# Patient Record
Sex: FEMALE
[De-identification: names, ages, dates, MRNs, and addresses within clinical notes are randomized per-mention and may not be internally consistent; named-entity substitution may affect disease eponyms.]

---

## 2022-12-12 PROBLEM — Z00.00 ENCOUNTER FOR PREVENTIVE HEALTH EXAMINATION: Status: ACTIVE | Noted: 2022-12-12

## 2023-01-03 ENCOUNTER — APPOINTMENT (OUTPATIENT)
Dept: ENDOCRINOLOGY | Facility: CLINIC | Age: 68
End: 2023-01-03
Payer: MEDICARE

## 2023-01-03 VITALS
DIASTOLIC BLOOD PRESSURE: 75 MMHG | BODY MASS INDEX: 26.9 KG/M2 | WEIGHT: 137 LBS | HEART RATE: 103 BPM | SYSTOLIC BLOOD PRESSURE: 136 MMHG | HEIGHT: 60 IN

## 2023-01-03 VITALS — WEIGHT: 137 LBS | BODY MASS INDEX: 26.9 KG/M2 | HEIGHT: 60 IN

## 2023-01-03 DIAGNOSIS — Z83.3 FAMILY HISTORY OF DIABETES MELLITUS: ICD-10-CM

## 2023-01-03 DIAGNOSIS — E78.2 MIXED HYPERLIPIDEMIA: ICD-10-CM

## 2023-01-03 DIAGNOSIS — E11.9 TYPE 2 DIABETES MELLITUS W/OUT COMPLICATIONS: ICD-10-CM

## 2023-01-03 PROCEDURE — 99204 OFFICE O/P NEW MOD 45 MIN: CPT

## 2023-01-03 RX ORDER — OMEPRAZOLE 40 MG/1
40 CAPSULE, DELAYED RELEASE ORAL
Qty: 30 | Refills: 0 | Status: ACTIVE | COMMUNITY
Start: 2022-07-22

## 2023-01-03 RX ORDER — 70%ISOPROPYL ALCOHOL 0.7 ML/ML
70 SWAB TOPICAL
Qty: 100 | Refills: 0 | Status: ACTIVE | COMMUNITY
Start: 2022-12-03

## 2023-01-03 RX ORDER — OLOPATADINE HYDROCHLORIDE 2 MG/ML
0.2 SOLUTION OPHTHALMIC
Qty: 2 | Refills: 0 | Status: ACTIVE | COMMUNITY
Start: 2022-07-22

## 2023-01-03 RX ORDER — LANCETS 30 GAUGE
EACH MISCELLANEOUS
Qty: 100 | Refills: 0 | Status: ACTIVE | COMMUNITY
Start: 2022-12-03

## 2023-01-03 RX ORDER — AZELASTINE HYDROCHLORIDE 0.5 MG/ML
0.05 SOLUTION/ DROPS OPHTHALMIC
Qty: 6 | Refills: 0 | Status: ACTIVE | COMMUNITY
Start: 2022-12-31

## 2023-01-03 RX ORDER — ATORVASTATIN CALCIUM 80 MG/1
80 TABLET, FILM COATED ORAL
Qty: 30 | Refills: 0 | Status: ACTIVE | COMMUNITY
Start: 2022-12-31

## 2023-01-03 RX ORDER — ALBUTEROL SULFATE 90 UG/1
108 (90 BASE) INHALANT RESPIRATORY (INHALATION)
Qty: 18 | Refills: 0 | Status: ACTIVE | COMMUNITY
Start: 2022-08-19

## 2023-01-03 RX ORDER — DAPAGLIFLOZIN 10 MG/1
10 TABLET, FILM COATED ORAL
Qty: 30 | Refills: 0 | Status: ACTIVE | COMMUNITY
Start: 2022-07-22

## 2023-01-03 RX ORDER — CARVEDILOL 3.12 MG/1
3.12 TABLET, FILM COATED ORAL
Qty: 60 | Refills: 0 | Status: ACTIVE | COMMUNITY
Start: 2022-12-31

## 2023-01-03 RX ORDER — BLOOD SUGAR DIAGNOSTIC
STRIP MISCELLANEOUS
Qty: 100 | Refills: 0 | Status: ACTIVE | COMMUNITY
Start: 2022-12-03

## 2023-01-03 RX ORDER — ICOSAPENT ETHYL 1000 MG/1
1 CAPSULE ORAL
Qty: 120 | Refills: 0 | Status: ACTIVE | COMMUNITY
Start: 2022-07-22

## 2023-01-05 RX ORDER — FLASH GLUCOSE SCANNING READER
EACH MISCELLANEOUS
Qty: 1 | Refills: 0 | Status: ACTIVE | COMMUNITY
Start: 2023-01-05 | End: 1900-01-01

## 2023-01-12 NOTE — CONSULT LETTER
[Dear  ___] : Dear  [unfilled], [Consult Letter:] : I had the pleasure of evaluating your patient, [unfilled]. [Please see my note below.] : Please see my note below. [Consult Closing:] : Thank you very much for allowing me to participate in the care of this patient.  If you have any questions, please do not hesitate to contact me. [Sincerely,] : Sincerely, [FreeTextEntry1] : Ms. Arenas has uncontrolled diabetes with diet non compliance as a huge factor.  I added glimepiride and pioglitazone but she has to reduce her carb intake to improve her sugar control.\par \par  [FreeTextEntry3] : Radha Weston MD\par Division of Endocrinology\par [facility]

## 2023-01-12 NOTE — ASSESSMENT
[FreeTextEntry1] : Diabetes, uncontrolled due to diet non compliance.\par Advised pt to reduce carbs (rice) in diet.  it's not that the medications didn't work, but she is out-eating medication effect (no medication is strong enough to counter her diet).  Carb guide given, recommended pt limit carb portions to half cup per meal; eat more vegetables and protein.  Suggested cabbage, other greens, tofu.\par Exercising 10 min after meal will help blunt post prandial glucose spike.\par She doesn't want to take insulin but may be agreeable in the future.  Discussed declining insulin reserve with longer duration of diabetes such that insulin use is often needed in long standing diabetes.  She also likely burned through her insulin reserve quickly given that her diet was high in carbs and her glucose control was not good (hyperglycemia is toxic to beta cells). \par For now, increase metformin to BID, continue Farxiga and I am adding pioglitazone 30mg/day (this will also help reduce trigs and improve fatty liver) and glimepiride 2mg (take with first meal of the day).\par I recommended using Roger 2 system to monitor glucose.  We watched video on how to apply sensor and she downloaded the Roger 2 олег for her phone.   Advised pt to check/scan sensor before each meal and at bedtime to check sugars.\par will add GLP1 agonist and/or insulin next visit if sugars are still not controlled.\par ophtho recommended.  I explained that she will not feel any symptoms of retinopathy, but diabetic changes could be occurring and only the eye doctor will be able to tell.\par continue statin therapy\par RTO 2 months

## 2023-01-12 NOTE — HISTORY OF PRESENT ILLNESS
[FreeTextEntry1] : Diabetes diagnosed about 8 years ago.  Her parents and 2 brothers have diabetes.  Her brothers are taking insulin.\par She says at first, she didn't care, so she didn't follow the diabetic diet.\par Now she stopped putting sugar in her coffee and she recently changed her rice to half brown, half white.  But her rice portions are large and she eats rice with every meal. \par her PCP put a Vernon Pro on her and she has the report:  14d (from 9/20 to 10/4) average 360.  8% high and 92% very high (nothing in range!).   the daily graph shows the sugar is high all the time.\par She had blurry vision before, but now is ok.\par she has dry mouth and nocturia 2-3x/night\par some tingling in her arm\par no chest pain or SOB\par she has not seen ophtho, says she didn't want to go\par She ate ice cream last night, and today, she has eaten noodle soup and rice.\par When she tried taking metformin BID, she gets dizzy.  SHe has used injectable meds in the past (including insulin, it seems) but she says they don't work, sugars are still high, and they are expensive.\par \par Meds\par metformin 500mg/day\par Farxiga 10mg\par atorvastatin 80mg, Vascepa\par carvedilol 3.125mg\par loratadine\par omeprazole\par vitamin D 50K weekly

## 2023-01-12 NOTE — PHYSICAL EXAM
[Alert] : alert [No Acute Distress] : no acute distress [No Proptosis] : no proptosis [No Lid Lag] : no lid lag [Normal Hearing] : hearing was normal [No LAD] : no lymphadenopathy [Thyroid Not Enlarged] : the thyroid was not enlarged [Clear to Auscultation] : lungs were clear to auscultation bilaterally [Normal S1, S2] : normal S1 and S2 [Regular Rhythm] : with a regular rhythm [No Edema] : no peripheral edema [Pedal Pulses Normal] : the pedal pulses are present [Normal Sensation on Monofilament Testing] : normal sensation on monofilament testing of lower extremities [Normal Affect] : the affect was normal [Normal Mood] : the mood was normal [Foot Ulcers] : no foot ulcers [de-identified] : fingerstick 323 [de-identified] : no onychomycoses

## 2023-01-12 NOTE — REASON FOR VISIT
[Initial Evaluation] : an initial evaluation [DM Type 2] : DM Type 2 [FreeTextEntry2] : Dr Christopher Ochoa

## 2023-04-05 ENCOUNTER — APPOINTMENT (OUTPATIENT)
Dept: ENDOCRINOLOGY | Facility: CLINIC | Age: 68
End: 2023-04-05
Payer: MEDICARE

## 2023-04-05 VITALS
HEART RATE: 92 BPM | BODY MASS INDEX: 30.86 KG/M2 | SYSTOLIC BLOOD PRESSURE: 128 MMHG | DIASTOLIC BLOOD PRESSURE: 71 MMHG | WEIGHT: 158 LBS

## 2023-04-05 LAB
GLUCOSE BLDC GLUCOMTR-MCNC: 164
HBA1C MFR BLD HPLC: 9.5

## 2023-04-05 PROCEDURE — 95249 CONT GLUC MNTR PT PROV EQP: CPT

## 2023-04-05 PROCEDURE — 99213 OFFICE O/P EST LOW 20 MIN: CPT | Mod: 25

## 2023-04-05 PROCEDURE — 83036 HEMOGLOBIN GLYCOSYLATED A1C: CPT | Mod: QW

## 2023-04-05 PROCEDURE — 82962 GLUCOSE BLOOD TEST: CPT

## 2023-04-05 RX ORDER — PIOGLITAZONE HYDROCHLORIDE 30 MG/1
30 TABLET ORAL
Qty: 90 | Refills: 1 | Status: ACTIVE | COMMUNITY
Start: 2023-01-03 | End: 1900-01-01

## 2023-04-05 RX ORDER — FLASH GLUCOSE SENSOR
KIT MISCELLANEOUS
Qty: 6 | Refills: 1 | Status: ACTIVE | COMMUNITY
Start: 2023-01-03 | End: 1900-01-01

## 2023-04-05 RX ORDER — GLIMEPIRIDE 2 MG/1
2 TABLET ORAL
Qty: 90 | Refills: 1 | Status: ACTIVE | COMMUNITY
Start: 2023-01-03 | End: 1900-01-01

## 2023-04-05 RX ORDER — METFORMIN HYDROCHLORIDE 500 MG/1
500 TABLET, COATED ORAL
Qty: 270 | Refills: 1 | Status: ACTIVE | COMMUNITY
Start: 2022-07-22 | End: 1900-01-01

## 2023-04-06 NOTE — ASSESSMENT
[FreeTextEntry1] : Diabetes, sugars improving but still high\par Titrate metformin to 500mg TID, continue glimepiride 2mg, Farxiga 10mg/day (do not increase, already at max dose) and pioglitazone 30mg.  She did not want to add GLP1 agonist.\par Weight gain is likely due to improving sugars (uncontrolled diabetes causes weight loss), as well as adding glimepiride and pioglitazone can contribute to weight gain.\par Increased physical activity recommended, suggested walking 20 min before work starts, or after lunch/afternoon. \par continue statin therapy\par Continue frequent glucose monitoring to  monitor sugars after meals and monitor for hypoglycemia.   Suggested using Skin Tac to improve adhesion and protect skin,  and Simpatch to keep sensor in place.\par continue statin therapy\par RTO 3 months. \par

## 2023-04-06 NOTE — HISTORY OF PRESENT ILLNESS
[FreeTextEntry1] : [employer] CGM report reviewed:  14d avd 253, 7% in range, 93% high, 87% CGM use\par sugars mostly still very high but this average is better than her average a few months ago (over 300).\par Vernon sensor doesn't stay on for 14 days.\par She reduced sugar in her diet and is not cooking with sugar anymore\par Her weight is up 20lb and she feels more hungry.   SHe is not interested in adding GLP1 agonist\par breakfast --  ham, bread, 2 eggs\par lunch -- red rice and steamed meat\par A1c is improved, 9.5%, from over 13\par \par Meds\par metformin 500mg/day\par Farxiga 10mg\par atorvastatin 80mg, Vascepa\par carvedilol 3.125mg\par loratadine\par omeprazole\par vitamin D 50K weekly

## 2023-04-06 NOTE — DATA REVIEWED
[FreeTextEntry1] : 3/23  A1c 9.5% POC\par 12/22  A1c 13.7%, tot chol 221, trig 542, HDL 51, TSH 3.95, urine alb/cr 29

## 2023-07-26 ENCOUNTER — APPOINTMENT (OUTPATIENT)
Dept: ENDOCRINOLOGY | Facility: CLINIC | Age: 68
End: 2023-07-26